# Patient Record
Sex: FEMALE
[De-identification: names, ages, dates, MRNs, and addresses within clinical notes are randomized per-mention and may not be internally consistent; named-entity substitution may affect disease eponyms.]

---

## 2023-08-04 NOTE — PROGRESS NOTES
Janet Hill (:  1995) is a 32 y.o. female,New patient, here for evaluation of the following chief complaint(s):  Establish Care (NP- had a psychiatrist previously prescribing medications, new to the area. )         ASSESSMENT/PLAN:  1. Anxiety and depression  -     [facility] - Dianna Scott, PMHNP, Psychiatry, [place]  -     FLUoxetine (PROZAC) 40 MG capsule; Take 1 capsule by mouth daily, Disp-90 capsule, R-1Normal  2. Infertility, female  -     [address], [address]  3. Acute pain of left knee  4. Screening for diabetes mellitus  -     Comprehensive Metabolic Panel; Future  -     Hemoglobin A1C; Future  5. Screening for thyroid disorder  -     TSH with Reflex; Future  6. Screening, ischemic heart disease  -     CBC with Auto Differential; Future  -     Comprehensive Metabolic Panel; Future  -     Lipid Panel; Future  -     Urinalysis with Reflex to Culture; Future  7. Need for hepatitis C screening test  -     Hepatitis C Antibody; Future  1. Anxiety and depression. Will refer to [facility] for further management since patient is taking Klonopin on a regular basis. 2.  Infertility female. Will refer to GYN. Patient told that partner should probably be tested first to rule out any problems with it. 3.  Left knee pain from [facility] - [facility]. Advised to rest.  4.  Schedule for complete physical exam.    Return in about 3 weeks (around 2023) for ffup for cpe with labs prior. Subjective   SUBJECTIVE/OBJECTIVE:  77-year-old  female comes in to [facility]. Medical illnesses include  1. Anxiety/depression. Patient on  Prozac and Klonopin. Seeing therapist.   2. L knee pain. Been going to boot camp. Review of Systems       Objective   Physical Exam  Constitutional:       Appearance: Normal appearance. She is normal weight. HENT:      Head: Normocephalic. Neck:      Vascular: No carotid bruit. Cardiovascular:      Rate and Rhythm: Normal rate and regular rhythm.

## 2023-08-10 ENCOUNTER — OFFICE VISIT (OUTPATIENT)
Dept: INTERNAL MEDICINE CLINIC | Facility: CLINIC | Age: 28
End: 2023-08-10
Payer: COMMERCIAL

## 2023-08-10 VITALS
BODY MASS INDEX: 23.35 KG/M2 | HEIGHT: 64 IN | HEART RATE: 67 BPM | OXYGEN SATURATION: 97 % | DIASTOLIC BLOOD PRESSURE: 76 MMHG | WEIGHT: 136.8 LBS | SYSTOLIC BLOOD PRESSURE: 117 MMHG

## 2023-08-10 DIAGNOSIS — F41.9 ANXIETY AND DEPRESSION: Primary | ICD-10-CM

## 2023-08-10 DIAGNOSIS — N97.9 INFERTILITY, FEMALE: ICD-10-CM

## 2023-08-10 DIAGNOSIS — Z13.29 SCREENING FOR THYROID DISORDER: ICD-10-CM

## 2023-08-10 DIAGNOSIS — Z13.1 SCREENING FOR DIABETES MELLITUS: ICD-10-CM

## 2023-08-10 DIAGNOSIS — Z13.6 SCREENING, ISCHEMIC HEART DISEASE: ICD-10-CM

## 2023-08-10 DIAGNOSIS — M25.562 ACUTE PAIN OF LEFT KNEE: ICD-10-CM

## 2023-08-10 DIAGNOSIS — F32.A ANXIETY AND DEPRESSION: Primary | ICD-10-CM

## 2023-08-10 DIAGNOSIS — Z11.59 NEED FOR HEPATITIS C SCREENING TEST: ICD-10-CM

## 2023-08-10 PROBLEM — F33.41 MDD (MAJOR DEPRESSIVE DISORDER), RECURRENT, IN PARTIAL REMISSION (HCC): Status: ACTIVE | Noted: 2017-04-26

## 2023-08-10 PROCEDURE — 99204 OFFICE O/P NEW MOD 45 MIN: CPT | Performed by: FAMILY MEDICINE

## 2023-08-10 RX ORDER — FLUOXETINE HYDROCHLORIDE 20 MG/1
40 CAPSULE ORAL DAILY
COMMUNITY
Start: 2023-05-14 | End: 2023-08-10 | Stop reason: SDUPTHER

## 2023-08-10 RX ORDER — FLUOXETINE HYDROCHLORIDE 40 MG/1
40 CAPSULE ORAL DAILY
Qty: 90 CAPSULE | Refills: 1 | Status: SHIPPED | OUTPATIENT
Start: 2023-08-10

## 2023-08-10 RX ORDER — CLONAZEPAM 0.5 MG/1
0.5 TABLET ORAL 2 TIMES DAILY PRN
COMMUNITY
Start: 2023-05-14

## 2023-08-10 ASSESSMENT — PATIENT HEALTH QUESTIONNAIRE - PHQ9
SUM OF ALL RESPONSES TO PHQ QUESTIONS 1-9: 2
1. LITTLE INTEREST OR PLEASURE IN DOING THINGS: 0
SUM OF ALL RESPONSES TO PHQ QUESTIONS 1-9: 2
SUM OF ALL RESPONSES TO PHQ QUESTIONS 1-9: 2
2. FEELING DOWN, DEPRESSED OR HOPELESS: 2
SUM OF ALL RESPONSES TO PHQ9 QUESTIONS 1 & 2: 2
SUM OF ALL RESPONSES TO PHQ QUESTIONS 1-9: 2

## 2023-09-07 NOTE — PROGRESS NOTES
SUBJECTIVE:   32 y.o. female for annual routine Pap and checkup. 29-year-old  female comes in for cpe. Medical illnesses include  1. Anxiety/depression. Patient on  Prozac and Klonopin. Seeing therapist.  Referred to [facility] because on klonopin on regular basis. 2. L knee pain. Been going to boot camp.   3.  Lab work done 2023 hepatitis C negative TSH normal hemoglobin A1c normal HDL 72  GFR normal LFT normal CBC within normal limits urinalysis normal    Orders Only on 2023   Component Date Value Ref Range Status    Hepatitis C Ab 2023 NONREACTIVE  NONREACTIVE   Final    TSH w Free Thyroid if Abnormal 2023 1.51  0.358 - 3.740 UIU/ML Final    Hemoglobin A1C 2023 4.9  4.8 - 5.6 % Final    eAG 2023 94  mg/dL Final    Comment: Reference Range  Normal: 4.8-5.6  Diabetic >=6.5%  Normal       <5.7%      Cholesterol, Total 2023 191  <200 MG/DL Final    Comment: Borderline High: 200-239 mg/dL  High: Greater than or equal to 240 mg/dL      Triglycerides 2023 73  35 - 150 MG/DL Final    Comment: Borderline High: 150-199 mg/dL, High: 200-499 mg/dL  Very High: Greater than or equal to 500 mg/dL      HDL 2023 72 (H)  40 - 60 MG/DL Final    LDL Calculated 2023 104.4 (H)  <100 MG/DL Final    Comment: Near Optimal: 100-129 mg/dL  Borderline High: 130-159, High: 160-189 mg/dL  Very High: Greater than or equal to 190 mg/dL      VLDL Cholesterol Calculated 2023 14.6  6.0 - 23.0 MG/DL Final    Chol/HDL Ratio 2023 2.7    Final    Sodium 2023 142  133 - 143 mmol/L Final    Potassium 2023 4.2  3.5 - 5.1 mmol/L Final    Chloride 2023 108  101 - 110 mmol/L Final    CO2 2023 28  21 - 32 mmol/L Final    Anion Gap 2023 6  2 - 11 mmol/L Final    Glucose 2023 89  65 - 100 mg/dL Final    BUN 2023 15  6 - 23 MG/DL Final    Creatinine 2023 0.80  0.6 - 1.0 MG/DL Final    Est, Glom Filt Rate 2023 >60  >60

## 2023-09-11 DIAGNOSIS — Z13.6 SCREENING, ISCHEMIC HEART DISEASE: ICD-10-CM

## 2023-09-11 DIAGNOSIS — Z11.59 NEED FOR HEPATITIS C SCREENING TEST: ICD-10-CM

## 2023-09-11 DIAGNOSIS — Z13.29 SCREENING FOR THYROID DISORDER: ICD-10-CM

## 2023-09-11 DIAGNOSIS — Z13.1 SCREENING FOR DIABETES MELLITUS: ICD-10-CM

## 2023-09-11 LAB
ALBUMIN SERPL-MCNC: 3.7 G/DL (ref 3.5–5)
ALBUMIN/GLOB SERPL: 1.1 (ref 0.4–1.6)
ALP SERPL-CCNC: 45 U/L (ref 50–136)
ALT SERPL-CCNC: 21 U/L (ref 12–65)
ANION GAP SERPL CALC-SCNC: 6 MMOL/L (ref 2–11)
APPEARANCE UR: CLEAR
AST SERPL-CCNC: 21 U/L (ref 15–37)
BACTERIA URNS QL MICRO: NEGATIVE /HPF
BASOPHILS # BLD: 0 K/UL (ref 0–0.2)
BASOPHILS NFR BLD: 0 % (ref 0–2)
BILIRUB SERPL-MCNC: 0.7 MG/DL (ref 0.2–1.1)
BILIRUB UR QL: NEGATIVE
BUN SERPL-MCNC: 15 MG/DL (ref 6–23)
CALCIUM SERPL-MCNC: 9.3 MG/DL (ref 8.3–10.4)
CASTS URNS QL MICRO: ABNORMAL /LPF (ref 0–2)
CHLORIDE SERPL-SCNC: 108 MMOL/L (ref 101–110)
CHOLEST SERPL-MCNC: 191 MG/DL
CO2 SERPL-SCNC: 28 MMOL/L (ref 21–32)
COLOR UR: ABNORMAL
CREAT SERPL-MCNC: 0.8 MG/DL (ref 0.6–1)
DIFFERENTIAL METHOD BLD: ABNORMAL
EOSINOPHIL # BLD: 0 K/UL (ref 0–0.8)
EOSINOPHIL NFR BLD: 1 % (ref 0.5–7.8)
EPI CELLS #/AREA URNS HPF: ABNORMAL /HPF (ref 0–5)
ERYTHROCYTE [DISTWIDTH] IN BLOOD BY AUTOMATED COUNT: 11.5 % (ref 11.9–14.6)
GLOBULIN SER CALC-MCNC: 3.5 G/DL (ref 2.8–4.5)
GLUCOSE SERPL-MCNC: 89 MG/DL (ref 65–100)
GLUCOSE UR STRIP.AUTO-MCNC: NEGATIVE MG/DL
HCT VFR BLD AUTO: 41.5 % (ref 35.8–46.3)
HDLC SERPL-MCNC: 72 MG/DL (ref 40–60)
HDLC SERPL: 2.7
HGB BLD-MCNC: 14.1 G/DL (ref 11.7–15.4)
HGB UR QL STRIP: NEGATIVE
IMM GRANULOCYTES # BLD AUTO: 0 K/UL (ref 0–0.5)
IMM GRANULOCYTES NFR BLD AUTO: 0 % (ref 0–5)
KETONES UR QL STRIP.AUTO: NEGATIVE MG/DL
LDLC SERPL CALC-MCNC: 104.4 MG/DL
LEUKOCYTE ESTERASE UR QL STRIP.AUTO: NEGATIVE
LYMPHOCYTES # BLD: 1.3 K/UL (ref 0.5–4.6)
LYMPHOCYTES NFR BLD: 25 % (ref 13–44)
MCH RBC QN AUTO: 30.6 PG (ref 26.1–32.9)
MCHC RBC AUTO-ENTMCNC: 34 G/DL (ref 31.4–35)
MCV RBC AUTO: 90 FL (ref 82–102)
MONOCYTES # BLD: 0.4 K/UL (ref 0.1–1.3)
MONOCYTES NFR BLD: 7 % (ref 4–12)
MUCOUS THREADS URNS QL MICRO: 0 /LPF
NEUTS SEG # BLD: 3.5 K/UL (ref 1.7–8.2)
NEUTS SEG NFR BLD: 67 % (ref 43–78)
NITRITE UR QL STRIP.AUTO: NEGATIVE
NRBC # BLD: 0 K/UL (ref 0–0.2)
PH UR STRIP: 7.5 (ref 5–9)
PLATELET # BLD AUTO: 210 K/UL (ref 150–450)
PMV BLD AUTO: 10.1 FL (ref 9.4–12.3)
POTASSIUM SERPL-SCNC: 4.2 MMOL/L (ref 3.5–5.1)
PROT SERPL-MCNC: 7.2 G/DL (ref 6.3–8.2)
PROT UR STRIP-MCNC: NEGATIVE MG/DL
RBC # BLD AUTO: 4.61 M/UL (ref 4.05–5.2)
RBC #/AREA URNS HPF: ABNORMAL /HPF (ref 0–5)
SODIUM SERPL-SCNC: 142 MMOL/L (ref 133–143)
SP GR UR REFRACTOMETRY: 1.02 (ref 1–1.02)
TRIGL SERPL-MCNC: 73 MG/DL (ref 35–150)
TSH W FREE THYROID IF ABNORMAL: 1.51 UIU/ML (ref 0.36–3.74)
URINE CULTURE IF INDICATED: ABNORMAL
UROBILINOGEN UR QL STRIP.AUTO: 0.2 EU/DL (ref 0.2–1)
VLDLC SERPL CALC-MCNC: 14.6 MG/DL (ref 6–23)
WBC # BLD AUTO: 5.2 K/UL (ref 4.3–11.1)
WBC URNS QL MICRO: ABNORMAL /HPF (ref 0–4)

## 2023-09-12 LAB
EST. AVERAGE GLUCOSE BLD GHB EST-MCNC: 94 MG/DL
HBA1C MFR BLD: 4.9 % (ref 4.8–5.6)
HCV AB SER QL: NONREACTIVE

## 2023-09-14 ENCOUNTER — OFFICE VISIT (OUTPATIENT)
Dept: INTERNAL MEDICINE CLINIC | Facility: CLINIC | Age: 28
End: 2023-09-14
Payer: COMMERCIAL

## 2023-09-14 VITALS
HEART RATE: 62 BPM | SYSTOLIC BLOOD PRESSURE: 106 MMHG | DIASTOLIC BLOOD PRESSURE: 55 MMHG | HEIGHT: 64 IN | TEMPERATURE: 98.2 F | OXYGEN SATURATION: 100 % | WEIGHT: 134.5 LBS | BODY MASS INDEX: 22.96 KG/M2

## 2023-09-14 DIAGNOSIS — F41.9 ANXIETY AND DEPRESSION: ICD-10-CM

## 2023-09-14 DIAGNOSIS — Z00.00 ANNUAL PHYSICAL EXAM: Primary | ICD-10-CM

## 2023-09-14 DIAGNOSIS — Z13.6 SCREENING, ISCHEMIC HEART DISEASE: ICD-10-CM

## 2023-09-14 DIAGNOSIS — F32.A ANXIETY AND DEPRESSION: ICD-10-CM

## 2023-09-14 DIAGNOSIS — N97.9 INFERTILITY, FEMALE: ICD-10-CM

## 2023-09-14 PROCEDURE — 99395 PREV VISIT EST AGE 18-39: CPT | Performed by: FAMILY MEDICINE

## 2023-09-14 SDOH — ECONOMIC STABILITY: FOOD INSECURITY: WITHIN THE PAST 12 MONTHS, THE FOOD YOU BOUGHT JUST DIDN'T LAST AND YOU DIDN'T HAVE MONEY TO GET MORE.: PATIENT DECLINED

## 2023-09-14 SDOH — ECONOMIC STABILITY: INCOME INSECURITY: HOW HARD IS IT FOR YOU TO PAY FOR THE VERY BASICS LIKE FOOD, HOUSING, MEDICAL CARE, AND HEATING?: PATIENT DECLINED

## 2023-09-14 SDOH — ECONOMIC STABILITY: HOUSING INSECURITY
IN THE LAST 12 MONTHS, WAS THERE A TIME WHEN YOU DID NOT HAVE A STEADY PLACE TO SLEEP OR SLEPT IN A SHELTER (INCLUDING NOW)?: PATIENT REFUSED

## 2023-09-14 SDOH — ECONOMIC STABILITY: FOOD INSECURITY: WITHIN THE PAST 12 MONTHS, YOU WORRIED THAT YOUR FOOD WOULD RUN OUT BEFORE YOU GOT MONEY TO BUY MORE.: PATIENT DECLINED

## 2023-10-16 ENCOUNTER — OFFICE VISIT (OUTPATIENT)
Dept: BEHAVIORAL/MENTAL HEALTH CLINIC | Age: 28
End: 2023-10-16

## 2023-10-16 VITALS
HEIGHT: 64 IN | DIASTOLIC BLOOD PRESSURE: 70 MMHG | HEART RATE: 60 BPM | TEMPERATURE: 97.3 F | OXYGEN SATURATION: 99 % | BODY MASS INDEX: 23.39 KG/M2 | WEIGHT: 137 LBS | SYSTOLIC BLOOD PRESSURE: 110 MMHG

## 2023-10-16 DIAGNOSIS — F41.1 GENERALIZED ANXIETY DISORDER: Primary | ICD-10-CM

## 2023-10-16 DIAGNOSIS — F33.1 MODERATE EPISODE OF RECURRENT MAJOR DEPRESSIVE DISORDER (HCC): ICD-10-CM

## 2023-10-16 DIAGNOSIS — F41.9 ANXIETY AND DEPRESSION: ICD-10-CM

## 2023-10-16 DIAGNOSIS — F32.A ANXIETY AND DEPRESSION: ICD-10-CM

## 2023-10-16 RX ORDER — CLONAZEPAM 0.5 MG/1
0.5 TABLET ORAL 2 TIMES DAILY PRN
Qty: 60 TABLET | Refills: 0 | Status: SHIPPED | OUTPATIENT
Start: 2023-10-16 | End: 2024-01-14

## 2023-10-16 RX ORDER — FLUOXETINE HYDROCHLORIDE 40 MG/1
40 CAPSULE ORAL DAILY
Qty: 90 CAPSULE | Refills: 0 | Status: SHIPPED | OUTPATIENT
Start: 2023-10-16

## 2023-10-16 ASSESSMENT — PATIENT HEALTH QUESTIONNAIRE - PHQ9
5. POOR APPETITE OR OVEREATING: 0
10. IF YOU CHECKED OFF ANY PROBLEMS, HOW DIFFICULT HAVE THESE PROBLEMS MADE IT FOR YOU TO DO YOUR WORK, TAKE CARE OF THINGS AT HOME, OR GET ALONG WITH OTHER PEOPLE: 1
6. FEELING BAD ABOUT YOURSELF - OR THAT YOU ARE A FAILURE OR HAVE LET YOURSELF OR YOUR FAMILY DOWN: 0
SUM OF ALL RESPONSES TO PHQ QUESTIONS 1-9: 6
SUM OF ALL RESPONSES TO PHQ QUESTIONS 1-9: 5
3. TROUBLE FALLING OR STAYING ASLEEP: 3
SUM OF ALL RESPONSES TO PHQ QUESTIONS 1-9: 6
7. TROUBLE CONCENTRATING ON THINGS, SUCH AS READING THE NEWSPAPER OR WATCHING TELEVISION: 0
SUM OF ALL RESPONSES TO PHQ QUESTIONS 1-9: 6
8. MOVING OR SPEAKING SO SLOWLY THAT OTHER PEOPLE COULD HAVE NOTICED. OR THE OPPOSITE, BEING SO FIGETY OR RESTLESS THAT YOU HAVE BEEN MOVING AROUND A LOT MORE THAN USUAL: 0
4. FEELING TIRED OR HAVING LITTLE ENERGY: 1
1. LITTLE INTEREST OR PLEASURE IN DOING THINGS: 0
9. THOUGHTS THAT YOU WOULD BE BETTER OFF DEAD, OR OF HURTING YOURSELF: 1
SUM OF ALL RESPONSES TO PHQ9 QUESTIONS 1 & 2: 1
2. FEELING DOWN, DEPRESSED OR HOPELESS: 1

## 2023-10-16 ASSESSMENT — COLUMBIA-SUICIDE SEVERITY RATING SCALE - C-SSRS
2. HAVE YOU ACTUALLY HAD ANY THOUGHTS OF KILLING YOURSELF?: NO
1. WITHIN THE PAST MONTH, HAVE YOU WISHED YOU WERE DEAD OR WISHED YOU COULD GO TO SLEEP AND NOT WAKE UP?: YES
6. HAVE YOU EVER DONE ANYTHING, STARTED TO DO ANYTHING, OR PREPARED TO DO ANYTHING TO END YOUR LIFE?: NO

## 2023-10-16 ASSESSMENT — ANXIETY QUESTIONNAIRES
4. TROUBLE RELAXING: 2
2. NOT BEING ABLE TO STOP OR CONTROL WORRYING: 2
3. WORRYING TOO MUCH ABOUT DIFFERENT THINGS: 2
1. FEELING NERVOUS, ANXIOUS, OR ON EDGE: 1
5. BEING SO RESTLESS THAT IT IS HARD TO SIT STILL: 1
6. BECOMING EASILY ANNOYED OR IRRITABLE: 0
7. FEELING AFRAID AS IF SOMETHING AWFUL MIGHT HAPPEN: 1
GAD7 TOTAL SCORE: 9

## 2023-10-16 NOTE — PROGRESS NOTES
hospitalizations: Denies  Previous therapy: Since 2017, twice monthly, [facility] in [place]. Previous ECT: Denies    Past Surgical History:   Procedure Laterality Date    WISDOM TOOTH EXTRACTION          Past Medical History:  History of head trauma: No  History of seizures: NO  Active Ambulatory Problems     Diagnosis Date Noted    Anxiety 07/28/2021    MDD (major depressive disorder), recurrent, in partial remission ([address]) 04/26/2017     Resolved Ambulatory Problems     Diagnosis Date Noted    No Resolved Ambulatory Problems     Past Medical History:   Diagnosis Date    Depression          Substance Abuse History (over the past 12 months, unless otherwise specified): Tobacco: denies  Caffeine: reports 1 cup of coffee daily  Alcohol: denies  Marijuana: denies  Cocaine: denies  Opiates: denies  Benzodiazepine: denies  Other illicit drug usage: denies      Legal consequences of substance/alcohol use: No  History of substance/alcohol abuse treatment: No  Readiness for substance/alcohol abuse treatment, if applicable: N/A    Past Family History:  Family history of mental health conditions: Father: MDD Brother: Bipolar/\"mild schizophrenia\". Reports \" a lot of mental health issues , dont know diagnosis\" on maternal side of family. Family history of suicide?no    Social History:  [place] to  parents youngest of 3 children. Endorses good relationship with parents and siblings. Progressed accordingly in school/extracurricular activities. Close group of friends, enjoyed studying/learning/played soft ball/chest clubs. Describes childhood as nice, fun and happy. Psychological trauma or Abuse: Denies  Living Situation/Interest:Rental house with fiance and a dog. Enjoys arts/crafts (crocheting), exercising, reading ( science literature), viturtual events with friends and spending itme with family. Speaks to niece, mother and siblings daily via phone.   Education:  PH.D environmental

## 2025-06-16 PROBLEM — F41.9 ANXIETY: Status: RESOLVED | Noted: 2021-07-28 | Resolved: 2025-06-16

## 2025-06-16 PROBLEM — O09.90 HIGH RISK PREGNANCY, ANTEPARTUM: Status: ACTIVE | Noted: 2025-06-16

## 2025-06-16 PROBLEM — F33.41 MDD (MAJOR DEPRESSIVE DISORDER), RECURRENT, IN PARTIAL REMISSION: Status: RESOLVED | Noted: 2017-04-26 | Resolved: 2025-06-16

## 2025-06-16 PROBLEM — F41.1 GENERALIZED ANXIETY DISORDER: Status: RESOLVED | Noted: 2023-10-16 | Resolved: 2025-06-16

## 2025-06-16 PROBLEM — O09.812 HIGH RISK PREGNANCY DUE TO ASSISTED REPRODUCTIVE TECHNOLOGY IN SECOND TRIMESTER: Status: ACTIVE | Noted: 2025-06-16

## 2025-06-16 PROBLEM — O99.340 MENTAL DISORDER AFFECTING PREGNANCY, ANTEPARTUM: Status: ACTIVE | Noted: 2025-06-16

## 2025-06-16 PROBLEM — F33.1 MODERATE EPISODE OF RECURRENT MAJOR DEPRESSIVE DISORDER (HCC): Status: RESOLVED | Noted: 2023-10-16 | Resolved: 2025-06-16

## 2025-06-20 ENCOUNTER — ROUTINE PRENATAL (OUTPATIENT)
Dept: OBGYN CLINIC | Age: 30
End: 2025-06-20

## 2025-06-20 VITALS
WEIGHT: 137.79 LBS | SYSTOLIC BLOOD PRESSURE: 119 MMHG | HEIGHT: 64 IN | DIASTOLIC BLOOD PRESSURE: 76 MMHG | BODY MASS INDEX: 23.52 KG/M2

## 2025-06-20 DIAGNOSIS — Z3A.21 21 WEEKS GESTATION OF PREGNANCY: ICD-10-CM

## 2025-06-20 DIAGNOSIS — O43.192 MARGINAL INSERTION OF UMBILICAL CORD AFFECTING MANAGEMENT OF MOTHER IN SECOND TRIMESTER: ICD-10-CM

## 2025-06-20 DIAGNOSIS — Z82.79 FAMILY HISTORY OF CHROMOSOMAL ABNORMALITY: ICD-10-CM

## 2025-06-20 DIAGNOSIS — O09.812 HIGH RISK PREGNANCY DUE TO ASSISTED REPRODUCTIVE TECHNOLOGY IN SECOND TRIMESTER: Primary | ICD-10-CM

## 2025-06-20 DIAGNOSIS — O99.340 MENTAL DISORDER AFFECTING PREGNANCY, ANTEPARTUM: ICD-10-CM

## 2025-06-20 RX ORDER — LANOLIN ALCOHOL/MO/W.PET/CERES
50 CREAM (GRAM) TOPICAL DAILY
COMMUNITY

## 2025-06-20 ASSESSMENT — PATIENT HEALTH QUESTIONNAIRE - PHQ9
1. LITTLE INTEREST OR PLEASURE IN DOING THINGS: SEVERAL DAYS
SUM OF ALL RESPONSES TO PHQ QUESTIONS 1-9: 2
SUM OF ALL RESPONSES TO PHQ QUESTIONS 1-9: 2
2. FEELING DOWN, DEPRESSED OR HOPELESS: SEVERAL DAYS
SUM OF ALL RESPONSES TO PHQ QUESTIONS 1-9: 2
SUM OF ALL RESPONSES TO PHQ QUESTIONS 1-9: 2

## 2025-06-20 NOTE — ASSESSMENT & PLAN NOTE
Low dose Aspirin  mg daily is recommended to be started at 12-16 weeks (some benefit seen with starting up to 28 weeks) for the prevention of preeclampsia  in high risk women. Consider stopping Aspirin at 36 weeks.  Recommend Vitamin D 2000IU daily and Calcium 1000mg daily to aid in protection of bones and teeth.  Recommend use of PNV daily with well-balanced diet.  Unless instructed otherwise, recommend continuation of physical activity throughout pregnancy.       IVF  Pregnancies as a result of IVF/ICSI are at increased risk of abnormal placentation, preeclampsia, risk of birth defects (including major) and heart defects. It is unclear how much the risks of birth defects are due to underlying parental factors as opposed to IVF technology itself. However, level 2 anatomy evaluation and fetal echo are recommended.     There are increased rates of lower birth weight and  delivery.     Genetic imprinting disorders associated with IVF/ICSI include Alexandra-Wiedemann, Silver-Kirby syndrome, Angelman syndrome, and Prader-Willi syndrome. These are likely due to methylation dynamics during embryologic development.     There is a possible increase in childhood cancer associated in children resulting from IVF/ICSI pregnancies.     Pregnancies as a result of frozen embryo transfer are at an increased risk for preeclampsia, postpartum hemorrhage, and abnormal placentation. This is thought to be secondary to differences in the development of maternal-fetal interface and/or absence of vasoactive substances typically released from corpus luteum.     Pregnancies achieved with oocyte/embryo donation have more than double the risk of preeclampsia when compared with other methods of ART and a more than fourfold increased risk compared with natural conception, likely secondary, to immunologic naivety. Additionally, these pregnancies have increased risks for  delivery and  delivery compared to other ART

## 2025-06-20 NOTE — ASSESSMENT & PLAN NOTE
MCI may be associated with fetal growth lag in third trimester.     More common in IVF pregnancies.     Reassurance

## 2025-06-20 NOTE — PATIENT INSTRUCTIONS
Resources for Depression/Anxiety  Postpartum Support International (PSI).    PSI Warmline:  8-373-759-4PPD (6244).  WWW.POSTPARTUM.NET    Mom's IMPACTT  [email or web address]      Suicide & Crisis Lifeline  Dial 988    National Pregnancy Registry for Psychiatric Medications  National Pregnancy Registry for Psychiatric Medications © - [facility] for Women's Mental Health ([email or web address].[email or web address])            Pregnancy causes many changes in the body, including in musculoskeletal system including back, pelvic girdle, and lower extremities. Today, reassurance provided regarding what is normal and not reviewed in detail.      Management must include supportive treatment for the pain, physical therapy for stabilizing exercises, and supports or braces, if appropriate. Additional treatment options include injections and acupuncture, but their efficacy is less clear.    Analgesia - Nonpharmacological treatment options include application of heat or cold and manual therapy, such as massage or spinal manipulation. Can consider lidocaine patch, capsaicin cream, TENS unit use.     Physical therapy - Work with a physical therapist can reduce pain and improve joint stabilization. Stabilizing exercises are targeted to the muscles supporting the pelvic girdle (eg, hip adductors and abductors, gluteus chirag, transverse abdominal muscles) and are supervised by a physical therapist. Information for pelvic floor/girdle pt given.      Aquatic therapy recommended.     Supports or braces - A brace or girdle provides compression and stability to the sacroiliac joints and improves disbursement of weight-bearing forces in the pelvis, back, hips, and legs. Use of a pelvic belt significantly decreases mobility of the sacroiliac joints and is most effective when the belt is positioned just caudal to the anterior superior iliac spines. The decrease in mobility is correlated

## 2025-06-20 NOTE — ASSESSMENT & PLAN NOTE
BUTCH d/o is in general AutoDom.   Likely de elieser, older sister and parents unaffected.      Would be very unlikely for this child to have this disorder.

## 2025-06-20 NOTE — PROGRESS NOTES
[facility] MATERNAL FETAL MEDICINE    [address] [place] [address]  P- [phone]  [phone]     MFM Consultation  Presents for evaluation of the following chief complaint(s):   Chief Complaint   Patient presents with    High Risk Pregnancy     Anxiety/Depression, FH chromosome abnormality, IVF       Janet Abbott (1995) is a 29 y.o.  at 21w2d with 10/29/2025, by Other Basis.     Patient is working full time, desk job.  Support person, Keaton, present today.   Personal and family history reviewed and updated as indicated.   Records from pregnancy reviewed. Chart updated to portray current issues.   Pt is scheduled to see Primary OB ([facility]) on 25.    No recent HA's.  Has mild edema in hands, denies pain or numbness. Denies Pre-eclamptic symptoms. No bleeding or LOF.  No contractions or cramping. No vaginal pressure recently. Reports good fetal movement.      IVF pregnancy with 5 remaining frozen embryos. PGT normal.   Placenta with accessory lobe and MCI (insertion not at the connecting section), likely secondary to IVF pregnancy.     No hx HTN or DM.     Both pt and spouse are concerned re possibility of depression exacerbation in pregnancy/pp. Sees psych in [place]. Off medication currently. Precautions discussed.     Mood evaluated today based on discussion with pt and PHQ screen. Mood concerns addressed as needed.    Mood Reassuring today  Recommend lifestyle/behavioral modifications to enhance mood (exercise, sunshine, mood apps, nutritional modifications, etc).     Reviewed gestational age precautions and activity goals/limitations; addressed normal pregnancy complaints, reassured and offered suggestions for care.  Nutritional counseling as well as specific goals based on current maternal and fetal status; options for GERD, constipation, other common complaints reviewed  Reviewed gestational age appropriate preventive care regarding communicable disease

## 2025-07-24 ENCOUNTER — HOSPITAL ENCOUNTER (OUTPATIENT)
Dept: PHYSICAL THERAPY | Age: 30
Setting detail: RECURRING SERIES
Discharge: HOME | End: 2025-07-27
Payer: COMMERCIAL

## 2025-07-24 DIAGNOSIS — R27.8 OTHER LACK OF COORDINATION: Primary | ICD-10-CM

## 2025-07-24 DIAGNOSIS — M62.838 OTHER MUSCLE SPASM: ICD-10-CM

## 2025-07-24 DIAGNOSIS — M62.81 MUSCLE WEAKNESS (GENERALIZED): ICD-10-CM

## 2025-07-24 PROCEDURE — 97162 PT EVAL MOD COMPLEX 30 MIN: CPT

## 2025-07-24 ASSESSMENT — PAIN SCALES - GENERAL: PAINLEVEL_OUTOF10: 1

## 2025-07-24 NOTE — THERAPY EVALUATION
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 Out Of State ([place] [employer])  Secondary:  [place] [address]  [address]  [facility] [address]  Phone: [phone]  Fax: [phone] Plan Frequency: 1xweek 5 weeks    Plan of Care/Certification Expiration Date: 10/22/25        Plan of Care/Certification Expiration Date:  Plan of Care/Certification Expiration Date: 10/22/25    Frequency/Duration: Plan Frequency: 1xweek 5 weeks      Time In/Out:   Time In: 0850  Time Out: 0945      PT Visit Info:    Plan Frequency: 1xweek 5 weeks      Visit Count:  1                OUTPATIENT PHYSICAL THERAPY:             Initial Assessment 2025               Episode (PFPT)         Treatment Diagnosis:     Other lack of coordination  Muscle weakness (generalized)  Other muscle spasm  Contributing Diagnosis:  Lower abdominal pain, unspecified (R10.30) and Myalgia (M79.1)  Medical/Referring Diagnosis:    Supervision of pregnancy resulting from assisted reproductive technology, unspecified trimester [O09.819]  Sciatica, right side [M54.31]  Referring Physician:  Sharmin Montano APRN - NP MD Orders:  PT Eval and Treat   Return MD Appt:  on going prenatal care   Date of Onset:  Onset Date: 25     Allergies:  Patient has no known allergies.  Restrictions/Precautions:    No strenuous lifting.       Medications Last Reviewed:  2025     SUBJECTIVE   History of Injury/Illness (Reason for Referral):  Ms. Ryan Abbott is a 30 yo female referred to pelvic floor physical therapy (PFPT) by Sharmin Montano A* 2/2 Supervision of pregnancy resulting from assisted reproductive technology, unspecified trimester [O09.819]  Sciatica, right side [M54.31].    KARSON 10/29/25: Pt states that back around  she started to have some back pain. She states that she was doing some piliates type of workout. She started to get some dull achy pain in r sided abdomen/hip flexor.

## 2025-07-24 NOTE — PROGRESS NOTES
Janet Abbott  : 1995  Primary: [employer] Out Of [place] ([place] [employer])  Secondary:  [place] [address]  [address]  [facility] [address]  Phone: [phone]  Fax: [phone] Plan Frequency: 1xweek 5 weeks    Plan of Care/Certification Expiration Date: 10/22/25        Plan of Care/Certification Expiration Date:  Plan of Care/Certification Expiration Date: 10/22/25    Frequency/Duration: Plan Frequency: 1xweek 5 weeks      Time In/Out:   Time In: 0850  Time Out: 0945      PT Visit Info:         Visit Count:  1    OUTPATIENT PHYSICAL THERAPY:   Treatment Note 2025       Episode  (PFPT)               Treatment Diagnosis:    Other lack of coordination  Muscle weakness (generalized)  Other muscle spasm  Medical/Referring Diagnosis:    Supervision of pregnancy resulting from assisted reproductive technology, unspecified trimester [O09.819]  Sciatica, right side [M54.31]      Referring Physician:  Sharmin Montano APRN - NP MD Orders:  PT Eval and Treat   Return MD Appt:  on going prenatal care   Date of Onset:  Onset Date: 25     Allergies:   Patient has no known allergies.  Restrictions/Precautions:   No strenuous lifting      Interventions Planned (Treatment may consist of any combination of the following):     See Assessment Note    Subjective Comments:   See Evaluation 25  Initial Pain Level::     1/10  Post Session Pain Level:       1/10  Medications Last Reviewed:  2025  Updated Objective Findings:  See Evaluation Note from today  Treatment   THERAPEUTIC EXERCISE: (10 minutes): Exercises per grid below to improve strength.  Required minimal verbal cues to promote proper body mechanics.     Date:  25 Date:   Date:     Activity/Exercise Parameters Parameters Parameters   HEP -supine TA  -sofia stretch  -piriformis stretch     Supine TA 3x 10             THERAPEUTIC ACTIVITY: ( 23 minutes): Functional activity education regarding

## 2025-07-30 ENCOUNTER — HOSPITAL ENCOUNTER (OUTPATIENT)
Dept: PHYSICAL THERAPY | Age: 30
Setting detail: RECURRING SERIES
Discharge: HOME | End: 2025-08-02
Payer: COMMERCIAL

## 2025-07-30 PROCEDURE — 97110 THERAPEUTIC EXERCISES: CPT

## 2025-07-30 PROCEDURE — 97140 MANUAL THERAPY 1/> REGIONS: CPT

## 2025-07-30 ASSESSMENT — PAIN SCALES - GENERAL: PAINLEVEL_OUTOF10: 2

## 2025-07-30 NOTE — PROGRESS NOTES
Janet Davis Abbott  : 1995  Primary: [employer] Of [place] [employer])  Secondary:  [place] [address]  [address]  [facility] [address]  Phone: [phone]  Fax: [phone] Plan Frequency: 1xweek 5 weeks    Plan of Care/Certification Expiration Date: 10/22/25        Plan of Care/Certification Expiration Date:  Plan of Care/Certification Expiration Date: 10/22/25    Frequency/Duration: Plan Frequency: 1xweek 5 weeks      Time In/Out:   Time In: 1401  Time Out: 1457      PT Visit Info:         Visit Count:  2    OUTPATIENT PHYSICAL THERAPY:   Treatment Note 2025       Episode  (PFPT)               Treatment Diagnosis:    Other lack of coordination  Muscle weakness (generalized)  Other muscle spasm  Medical/Referring Diagnosis:    Supervision of pregnancy resulting from assisted reproductive technology, unspecified trimester [O09.819]  Sciatica, right side [M54.31]  Referring Physician:  Sharmin Montano APRN - NP MD Orders:  PT Eval and Treat   Return MD Appt:  on going prenatal care   Date of Onset:  Onset Date: 25     Allergies:   Patient has no known allergies.  Restrictions/Precautions:   No strenuous lifting      Interventions Planned (Treatment may consist of any combination of the following):     See Assessment Note    Subjective Comments:   Pt states that she bough Serola belt and states that it is helpful when she wears it. Has been doing HEP and does find it helpful.   Initial Pain Level::     2/10  Post Session Pain Level:       2/10  Medications Last Reviewed:  2025  Updated Objective Findings:  None Today  Treatment   THERAPEUTIC EXERCISE: (46 minutes): Exercises per grid below to improve strength.  Required minimal verbal cues to promote proper body mechanics.     Date:  25 Date:  25 Date:     Activity/Exercise Parameters Parameters Parameters   HEP -supine TA  -sofia stretch  -piriformis stretch -changed sofia

## 2025-08-04 RX ORDER — CYCLOBENZAPRINE HCL 10 MG
10 TABLET ORAL 3 TIMES DAILY PRN
COMMUNITY
Start: 2025-07-08

## 2025-08-04 RX ORDER — ACETAMINOPHEN 650 MG/1
650 SUPPOSITORY RECTAL ONCE
COMMUNITY

## 2025-08-06 ENCOUNTER — HOSPITAL ENCOUNTER (OUTPATIENT)
Dept: PHYSICAL THERAPY | Age: 30
Setting detail: RECURRING SERIES
Discharge: HOME | End: 2025-08-09
Payer: COMMERCIAL

## 2025-08-06 PROCEDURE — 97110 THERAPEUTIC EXERCISES: CPT

## 2025-08-06 PROCEDURE — 97140 MANUAL THERAPY 1/> REGIONS: CPT

## 2025-08-06 ASSESSMENT — PAIN SCALES - GENERAL: PAINLEVEL_OUTOF10: 0

## 2025-08-13 SDOH — ECONOMIC STABILITY: INCOME INSECURITY: IN THE LAST 12 MONTHS, WAS THERE A TIME WHEN YOU WERE NOT ABLE TO PAY THE MORTGAGE OR RENT ON TIME?: NO

## 2025-08-13 SDOH — ECONOMIC STABILITY: FOOD INSECURITY: WITHIN THE PAST 12 MONTHS, YOU WORRIED THAT YOUR FOOD WOULD RUN OUT BEFORE YOU GOT MONEY TO BUY MORE.: NEVER TRUE

## 2025-08-13 SDOH — ECONOMIC STABILITY: FOOD INSECURITY: WITHIN THE PAST 12 MONTHS, THE FOOD YOU BOUGHT JUST DIDN'T LAST AND YOU DIDN'T HAVE MONEY TO GET MORE.: NEVER TRUE

## 2025-08-13 SDOH — ECONOMIC STABILITY: TRANSPORTATION INSECURITY
IN THE PAST 12 MONTHS, HAS LACK OF TRANSPORTATION KEPT YOU FROM MEETINGS, WORK, OR FROM GETTING THINGS NEEDED FOR DAILY LIVING?: NO

## 2025-08-13 SDOH — ECONOMIC STABILITY: TRANSPORTATION INSECURITY
IN THE PAST 12 MONTHS, HAS THE LACK OF TRANSPORTATION KEPT YOU FROM MEDICAL APPOINTMENTS OR FROM GETTING MEDICATIONS?: NO

## 2025-08-14 ENCOUNTER — HOSPITAL ENCOUNTER (OUTPATIENT)
Dept: PHYSICAL THERAPY | Age: 30
Setting detail: RECURRING SERIES
Discharge: HOME | End: 2025-08-17
Payer: COMMERCIAL

## 2025-08-14 PROCEDURE — 97110 THERAPEUTIC EXERCISES: CPT

## 2025-08-14 ASSESSMENT — PAIN SCALES - GENERAL: PAINLEVEL_OUTOF10: 0

## 2025-08-15 ENCOUNTER — ROUTINE PRENATAL (OUTPATIENT)
Dept: OBGYN CLINIC | Age: 30
End: 2025-08-15

## 2025-08-15 VITALS — SYSTOLIC BLOOD PRESSURE: 127 MMHG | DIASTOLIC BLOOD PRESSURE: 80 MMHG | WEIGHT: 153 LBS | BODY MASS INDEX: 26.26 KG/M2

## 2025-08-15 DIAGNOSIS — O43.192 MARGINAL INSERTION OF UMBILICAL CORD AFFECTING MANAGEMENT OF MOTHER IN SECOND TRIMESTER: ICD-10-CM

## 2025-08-15 DIAGNOSIS — O43.199 BILOBED PLACENTA: ICD-10-CM

## 2025-08-15 DIAGNOSIS — O99.340 MENTAL DISORDER AFFECTING PREGNANCY, ANTEPARTUM: ICD-10-CM

## 2025-08-15 DIAGNOSIS — Z82.79 FAMILY HISTORY OF CHROMOSOMAL ABNORMALITY: ICD-10-CM

## 2025-08-15 DIAGNOSIS — O09.812 HIGH RISK PREGNANCY DUE TO ASSISTED REPRODUCTIVE TECHNOLOGY IN SECOND TRIMESTER: Primary | ICD-10-CM

## 2025-08-15 DIAGNOSIS — Z3A.29 29 WEEKS GESTATION OF PREGNANCY: ICD-10-CM

## 2025-08-15 PROBLEM — F32.9 MAJOR DEPRESSIVE DISORDER, SINGLE EPISODE, UNSPECIFIED: Status: ACTIVE | Noted: 2025-08-07

## 2025-08-15 RX ORDER — LORAZEPAM 0.5 MG/1
TABLET ORAL
COMMUNITY
Start: 2025-08-05

## 2025-08-15 RX ORDER — FLUOXETINE 10 MG/1
CAPSULE ORAL
COMMUNITY
Start: 2025-08-05

## 2025-08-15 ASSESSMENT — PATIENT HEALTH QUESTIONNAIRE - PHQ9
2. FEELING DOWN, DEPRESSED OR HOPELESS: SEVERAL DAYS
SUM OF ALL RESPONSES TO PHQ QUESTIONS 1-9: 2
SUM OF ALL RESPONSES TO PHQ QUESTIONS 1-9: 2
1. LITTLE INTEREST OR PLEASURE IN DOING THINGS: SEVERAL DAYS
SUM OF ALL RESPONSES TO PHQ QUESTIONS 1-9: 2
SUM OF ALL RESPONSES TO PHQ QUESTIONS 1-9: 2

## 2025-08-17 PROBLEM — O43.199 BILOBED PLACENTA: Status: ACTIVE | Noted: 2025-08-17

## 2025-08-20 ENCOUNTER — HOSPITAL ENCOUNTER (OUTPATIENT)
Dept: PHYSICAL THERAPY | Age: 30
Setting detail: RECURRING SERIES
Discharge: HOME | End: 2025-08-23
Payer: COMMERCIAL

## 2025-08-20 PROCEDURE — 97530 THERAPEUTIC ACTIVITIES: CPT

## 2025-08-20 PROCEDURE — 97110 THERAPEUTIC EXERCISES: CPT

## 2025-08-20 ASSESSMENT — PAIN SCALES - GENERAL: PAINLEVEL_OUTOF10: 0

## 2025-08-27 ENCOUNTER — HOSPITAL ENCOUNTER (OUTPATIENT)
Dept: PHYSICAL THERAPY | Age: 30
Setting detail: RECURRING SERIES
End: 2025-08-27
Payer: COMMERCIAL

## 2025-09-02 ENCOUNTER — HOSPITAL ENCOUNTER (OUTPATIENT)
Dept: PHYSICAL THERAPY | Age: 30
Setting detail: RECURRING SERIES
Discharge: HOME | End: 2025-09-05
Payer: COMMERCIAL

## 2025-09-02 PROCEDURE — 97110 THERAPEUTIC EXERCISES: CPT

## 2025-09-02 PROCEDURE — 97530 THERAPEUTIC ACTIVITIES: CPT

## 2025-09-02 ASSESSMENT — PAIN SCALES - GENERAL: PAINLEVEL_OUTOF10: 1
